# Patient Record
Sex: FEMALE | Race: WHITE | NOT HISPANIC OR LATINO | Employment: OTHER | URBAN - METROPOLITAN AREA
[De-identification: names, ages, dates, MRNs, and addresses within clinical notes are randomized per-mention and may not be internally consistent; named-entity substitution may affect disease eponyms.]

---

## 2020-05-22 ENCOUNTER — HOSPITAL ENCOUNTER (INPATIENT)
Facility: HOSPITAL | Age: 65
LOS: 2 days | Discharge: HOME | End: 2020-05-24
Attending: HOSPITALIST | Admitting: INTERNAL MEDICINE

## 2020-05-22 ENCOUNTER — APPOINTMENT (OUTPATIENT)
Dept: MRI IMAGING | Facility: HOSPITAL | Age: 65
End: 2020-05-22

## 2020-05-22 PROBLEM — M19.90 ARTHRITIS: Status: ACTIVE | Noted: 2020-05-22

## 2020-05-22 PROBLEM — R25.2 MUSCLE CRAMPS: Status: ACTIVE | Noted: 2020-05-22

## 2020-05-22 PROBLEM — E87.1 HYPONATREMIA: Status: ACTIVE | Noted: 2020-05-22

## 2020-05-22 PROBLEM — I10 ESSENTIAL HYPERTENSION: Status: ACTIVE | Noted: 2020-05-22

## 2020-05-22 PROBLEM — R52 GENERALIZED BODY ACHES: Status: ACTIVE | Noted: 2020-05-22

## 2020-05-22 LAB
ALBUMIN SERPL-MCNC: 4.2 G/DL (ref 3.5–5.2)
ALBUMIN/GLOB SERPL: 1.5 G/DL
ALP SERPL-CCNC: 165 U/L (ref 39–117)
ALT SERPL W P-5'-P-CCNC: 19 U/L (ref 1–33)
ANION GAP SERPL CALCULATED.3IONS-SCNC: 9.9 MMOL/L (ref 5–15)
AST SERPL-CCNC: 29 U/L (ref 1–32)
BASOPHILS # BLD AUTO: 0.04 10*3/MM3 (ref 0–0.2)
BASOPHILS NFR BLD AUTO: 0.6 % (ref 0–1.5)
BILIRUB SERPL-MCNC: 0.5 MG/DL (ref 0.2–1.2)
BUN BLD-MCNC: 11 MG/DL (ref 8–23)
BUN/CREAT SERPL: 13.4 (ref 7–25)
CALCIUM SPEC-SCNC: 9.5 MG/DL (ref 8.6–10.5)
CHLORIDE SERPL-SCNC: 88 MMOL/L (ref 98–107)
CO2 SERPL-SCNC: 24.1 MMOL/L (ref 22–29)
CORTIS SERPL-MCNC: 8.19 MCG/DL
CREAT BLD-MCNC: 0.82 MG/DL (ref 0.57–1)
DEPRECATED RDW RBC AUTO: 45.1 FL (ref 37–54)
EOSINOPHIL # BLD AUTO: 0.05 10*3/MM3 (ref 0–0.4)
EOSINOPHIL NFR BLD AUTO: 0.8 % (ref 0.3–6.2)
ERYTHROCYTE [DISTWIDTH] IN BLOOD BY AUTOMATED COUNT: 14 % (ref 12.3–15.4)
GFR SERPL CREATININE-BSD FRML MDRD: 70 ML/MIN/1.73
GLOBULIN UR ELPH-MCNC: 2.8 GM/DL
GLUCOSE BLD-MCNC: 93 MG/DL (ref 65–99)
HCT VFR BLD AUTO: 35.3 % (ref 34–46.6)
HGB BLD-MCNC: 12.4 G/DL (ref 12–15.9)
IMM GRANULOCYTES # BLD AUTO: 0.01 10*3/MM3 (ref 0–0.05)
IMM GRANULOCYTES NFR BLD AUTO: 0.2 % (ref 0–0.5)
LYMPHOCYTES # BLD AUTO: 1.86 10*3/MM3 (ref 0.7–3.1)
LYMPHOCYTES NFR BLD AUTO: 28.3 % (ref 19.6–45.3)
MCH RBC QN AUTO: 31.2 PG (ref 26.6–33)
MCHC RBC AUTO-ENTMCNC: 35.1 G/DL (ref 31.5–35.7)
MCV RBC AUTO: 88.7 FL (ref 79–97)
MONOCYTES # BLD AUTO: 0.69 10*3/MM3 (ref 0.1–0.9)
MONOCYTES NFR BLD AUTO: 10.5 % (ref 5–12)
NEUTROPHILS # BLD AUTO: 3.92 10*3/MM3 (ref 1.7–7)
NEUTROPHILS NFR BLD AUTO: 59.6 % (ref 42.7–76)
NRBC BLD AUTO-RTO: 0 /100 WBC (ref 0–0.2)
OSMOLALITY UR: 166 MOSM/KG
PLATELET # BLD AUTO: 340 10*3/MM3 (ref 140–450)
PMV BLD AUTO: 10 FL (ref 6–12)
POTASSIUM BLD-SCNC: 3.8 MMOL/L (ref 3.5–5.2)
PROT SERPL-MCNC: 7 G/DL (ref 6–8.5)
RBC # BLD AUTO: 3.98 10*6/MM3 (ref 3.77–5.28)
SODIUM BLD-SCNC: 122 MMOL/L (ref 136–145)
WBC NRBC COR # BLD: 6.57 10*3/MM3 (ref 3.4–10.8)

## 2020-05-22 PROCEDURE — 80053 COMPREHEN METABOLIC PANEL: CPT | Performed by: NURSE PRACTITIONER

## 2020-05-22 PROCEDURE — 0 GADOBENATE DIMEGLUMINE 529 MG/ML SOLUTION: Performed by: INTERNAL MEDICINE

## 2020-05-22 PROCEDURE — 25010000002 LORAZEPAM PER 2 MG: Performed by: INTERNAL MEDICINE

## 2020-05-22 PROCEDURE — 85025 COMPLETE CBC W/AUTO DIFF WBC: CPT | Performed by: NURSE PRACTITIONER

## 2020-05-22 PROCEDURE — A9577 INJ MULTIHANCE: HCPCS | Performed by: INTERNAL MEDICINE

## 2020-05-22 PROCEDURE — 82533 TOTAL CORTISOL: CPT | Performed by: NURSE PRACTITIONER

## 2020-05-22 PROCEDURE — 83935 ASSAY OF URINE OSMOLALITY: CPT | Performed by: NURSE PRACTITIONER

## 2020-05-22 PROCEDURE — 70551 MRI BRAIN STEM W/O DYE: CPT

## 2020-05-22 RX ORDER — SODIUM CHLORIDE 0.9 % (FLUSH) 0.9 %
10 SYRINGE (ML) INJECTION AS NEEDED
Status: DISCONTINUED | OUTPATIENT
Start: 2020-05-22 | End: 2020-05-24 | Stop reason: HOSPADM

## 2020-05-22 RX ORDER — NITROGLYCERIN 0.4 MG/1
0.4 TABLET SUBLINGUAL
Status: DISCONTINUED | OUTPATIENT
Start: 2020-05-22 | End: 2020-05-24 | Stop reason: HOSPADM

## 2020-05-22 RX ORDER — PROMETHAZINE HYDROCHLORIDE 25 MG/1
25 TABLET ORAL EVERY 6 HOURS PRN
COMMUNITY

## 2020-05-22 RX ORDER — LISINOPRIL 10 MG/1
TABLET ORAL
COMMUNITY
Start: 2020-02-14

## 2020-05-22 RX ORDER — LORAZEPAM 2 MG/ML
1 INJECTION INTRAMUSCULAR ONCE AS NEEDED
Status: COMPLETED | OUTPATIENT
Start: 2020-05-22 | End: 2020-05-22

## 2020-05-22 RX ORDER — LISINOPRIL 10 MG/1
10 TABLET ORAL DAILY
Status: DISCONTINUED | OUTPATIENT
Start: 2020-05-22 | End: 2020-05-22

## 2020-05-22 RX ORDER — SODIUM CHLORIDE 0.9 % (FLUSH) 0.9 %
10 SYRINGE (ML) INJECTION EVERY 12 HOURS SCHEDULED
Status: DISCONTINUED | OUTPATIENT
Start: 2020-05-22 | End: 2020-05-24 | Stop reason: HOSPADM

## 2020-05-22 RX ORDER — DICLOFENAC SODIUM 75 MG/1
75 TABLET, DELAYED RELEASE ORAL
COMMUNITY
Start: 2019-11-19 | End: 2021-01-06

## 2020-05-22 RX ORDER — ACETAMINOPHEN 650 MG/1
650 SUPPOSITORY RECTAL EVERY 4 HOURS PRN
Status: DISCONTINUED | OUTPATIENT
Start: 2020-05-22 | End: 2020-05-24 | Stop reason: HOSPADM

## 2020-05-22 RX ORDER — ACETAMINOPHEN 160 MG/5ML
650 SOLUTION ORAL EVERY 4 HOURS PRN
Status: DISCONTINUED | OUTPATIENT
Start: 2020-05-22 | End: 2020-05-24 | Stop reason: HOSPADM

## 2020-05-22 RX ORDER — ACETAMINOPHEN 325 MG/1
650 TABLET ORAL EVERY 4 HOURS PRN
Status: DISCONTINUED | OUTPATIENT
Start: 2020-05-22 | End: 2020-05-24 | Stop reason: HOSPADM

## 2020-05-22 RX ORDER — ONDANSETRON 2 MG/ML
4 INJECTION INTRAMUSCULAR; INTRAVENOUS EVERY 6 HOURS PRN
Status: DISCONTINUED | OUTPATIENT
Start: 2020-05-22 | End: 2020-05-24 | Stop reason: HOSPADM

## 2020-05-22 RX ORDER — LISINOPRIL 10 MG/1
10 TABLET ORAL NIGHTLY
Status: DISCONTINUED | OUTPATIENT
Start: 2020-05-22 | End: 2020-05-24 | Stop reason: HOSPADM

## 2020-05-22 RX ADMIN — LISINOPRIL 10 MG: 10 TABLET ORAL at 22:09

## 2020-05-22 RX ADMIN — SODIUM CHLORIDE, PRESERVATIVE FREE 10 ML: 5 INJECTION INTRAVENOUS at 02:10

## 2020-05-22 RX ADMIN — SODIUM CHLORIDE, PRESERVATIVE FREE 10 ML: 5 INJECTION INTRAVENOUS at 08:09

## 2020-05-22 RX ADMIN — LORAZEPAM 1 MG: 2 INJECTION INTRAMUSCULAR; INTRAVENOUS at 12:19

## 2020-05-22 RX ADMIN — SODIUM CHLORIDE, PRESERVATIVE FREE 10 ML: 5 INJECTION INTRAVENOUS at 20:41

## 2020-05-22 NOTE — CONSULTS
Referring Provider: Dr. Abiel May  Reason for Consultation: hyponatremia    Subjective     Chief complaint No chief complaint on file.      History of present illness:  64 yo WF with normal renal fxn who denies prior problems with hypoNa, admitted earlier today for further evaluation of weakness, paresthesias, and muscle cramping.  Found to have a serum sodium level of 122.  Last serum sodium level for comparison from December 2018: serum sodium then was 139.  Full PMH outlined below; pertinent is hypertension on lisinopril and no diuretic use; chronic NSAID use; no prior urinary tract infections; no prior stones.  Drinks easily 64 ounces of water every day in addition to other beverages; denies any alcohol use.  Blood pressure stable while here; cortisol level normal.  Recent visit to ER at [facility] within the last few days; TSH normal when checked there.  Serum sodium level was 118  · States that her fatigue and paresthesias have been present for several weeks; she has had longstanding problems with muscle cramps that seem to worsen over the last few days  · Loss of appetite for the past few months with 20-pound weight loss over the past 3 months; no nausea or vomiting  · No urinary complaints other than polyuria and nocturia  · Bowel movements fine  · No leg swelling  · No shortness of breath or orthopnea; no fever, chills, or cough    Past Medical History:   Diagnosis Date   • Bleeding ulcer     2014   • Hx of staphylococcal infection     LEFT KNEE   • Knee pain, right    • Shoulder pain, right      Past Surgical History:   Procedure Laterality Date   • KNEE ARTHROSCOPY Bilateral    • AK TOTAL KNEE ARTHROPLASTY Right 4/20/2016    Procedure: RT TOTAL KNEE ARTHROPLASTY, STEROID INJECTION RIGHT SHOULDER;  Surgeon: Conner Story MD;  Location: [facility];  Service: Orthopedics   • SINUS SURGERY      X2   • TOTAL KNEE ARTHROPLASTY Left      No family history on file.  Social History  "    Tobacco Use   • Smoking status: Never Smoker   • Smokeless tobacco: Never Used   Substance Use Topics   • Alcohol use: No   • Drug use: No     Medications Prior to Admission   Medication Sig Dispense Refill Last Dose   • b complex-C-folic acid 1 MG capsule Take 1 capsule by mouth Daily.   Past Week at Unknown time   • diclofenac (VOLTAREN) 75 MG EC tablet Take 75 mg by mouth.   Past Week at Unknown time   • IBUPROFEN PO Take  by mouth.   Past Week at Unknown time   • lisinopril (PRINIVIL,ZESTRIL) 10 MG tablet TAKE 1 TABLET BY MOUTH DAILY   Past Week at Unknown time   • promethazine (PHENERGAN) 25 MG tablet Take 25 mg by mouth Every 6 (Six) Hours As Needed for Nausea or Vomiting.   5/21/2020 at Unknown time   • Turmeric 1053 MG tablet Take 1,053 mg by mouth Daily.   Past Week at Unknown time     Allergies:  Vancomycin    Review of Systems  14-point ROS performed and all negative except for pertinent +/-'s detailed in HPI.     Objective     Vital Signs  Temp:  [97.8 °F (36.6 °C)-98.3 °F (36.8 °C)] 97.8 °F (36.6 °C)  Heart Rate:  [76-96] 77  Resp:  [16-18] 18  BP: (111-164)/(58-76) 127/68    Flowsheet Rows      First Filed Value   Admission Height  162.6 cm (64\") Documented at 05/22/2020 0137   Admission Weight  75.3 kg (166 lb) Documented at 05/22/2020 0137           No intake/output data recorded.  I/O last 3 completed shifts:  In: -   Out: 1700 [Urine:1700]    Intake/Output Summary (Last 24 hours) at 5/22/2020 1955  Last data filed at 5/22/2020 1739  Gross per 24 hour   Intake --   Output 1700 ml   Net -1700 ml       Physical Exam:  NAD; pleasant; oriented; looks stated age  Odd affect; normal mood  MMM; AT/NC   No eye discharge; no scleral icterus  No JVD; no carotid bruits  CTA bilat; not labored  RRR, no rub  Soft, NT, ND, BS+  No edema  No clubbing  No asterixis  Moves all extremities   Speech is fluent    Results Review:  Results from last 7 days   Lab Units 05/22/20  0206   SODIUM mmol/L 122*   POTASSIUM " mmol/L 3.8   CHLORIDE mmol/L 88*   CO2 mmol/L 24.1   BUN mg/dL 11   CREATININE mg/dL 0.82   CALCIUM mg/dL 9.5   BILIRUBIN mg/dL 0.5   ALK PHOS U/L 165*   ALT (SGPT) U/L 19   AST (SGOT) U/L 29   GLUCOSE mg/dL 93       Estimated Creatinine Clearance: 67 mL/min (by C-G formula based on SCr of 0.82 mg/dL).          Results from last 7 days   Lab Units 05/22/20  0206   WBC 10*3/mm3 6.57   HEMOGLOBIN g/dL 12.4   PLATELETS 10*3/mm3 340             Active Medications    gadobenate dimeglumine 15 mL Intravenous Once in imaging   lisinopril 10 mg Oral Daily   sodium chloride 10 mL Intravenous Q12H          Assessment/Plan   Assessment  1.  Hyponatremia with euvolemia: Probably due to polydipsia; drinks at least 64 ounces of water daily in addition to other beverages.  Regularly takes NSAIDs (diclofenac), which impairs renal water excretion, thereby frequently exacerbating hyponatremia.  Low urine osmolality consistent with polydipsia.  Normal TSH and cortisol; normal serum creatinine.  Already has good urine output, suggesting water diuresis  2.  Hypertension, controlled  3.  Fatigue, paresthesias, and muscle cramping: possibly all due to her hyponatremia      Hyponatremia    Generalized body aches    Essential hypertension    Arthritis    Muscle cramps      Plan  1.  Modest fluid restriction, 1500 mL/day  2.  Surveillance labs    I discussed the patient's findings and my recommendations with the patient    Feliz Bojorquez MD  05/22/20  19:55

## 2020-05-22 NOTE — PROGRESS NOTES
Discharge Planning Assessment  [facility]     Patient Name: Luann Rodríguez  MRN: [id number]  Today's Date: 5/22/2020    Admit Date: 5/22/2020    Discharge Needs Assessment     Row Name 05/22/20 1601       Living Environment    Lives With  spouse    Name(s) of Who Lives With Patient  Igor Rodríguez, spouse, [phone]    Current Living Arrangements  home/apartment/condo    Primary Care Provided by  self    Provides Primary Care For  no one    Family Caregiver if Needed  spouse    Family Caregiver Names  Igor Rodríguez, spouse, [phone]    Quality of Family Relationships  unable to assess    Able to Return to Prior Arrangements  yes       Resource/Environmental Concerns    Resource/Environmental Concerns  home accessibility    Home Accessibility Concerns  stairs to enter home       Transition Planning    Patient/Family Anticipates Transition to  home with family    Patient/Family Anticipated Services at Transition  none    Transportation Anticipated  family or friend will provide       Discharge Needs Assessment    Readmission Within the Last 30 Days  no previous admission in last 30 days    Concerns to be Addressed  denies needs/concerns at this time;care coordination/care conferences;discharge planning    Equipment Currently Used at Home  walker, rolling    Anticipated Changes Related to Illness  none    Equipment Needed After Discharge  none    Provided Post Acute Provider List?  Refused        Discharge Plan     Row Name 05/22/20 8251       Plan    Plan  Home with spouse. Denies any D/C needs. Family to transport at D/C    Patient/Family in Agreement with Plan  yes    Plan Comments  Met with pt. at bedside. Explained roll of . Face sheet and pharmacy verified. Pt lives with Igor Rodríguez, spouse, [phone]. There are 3 steps to enter home.  DME equipment includes a walker that pt. states she no longer uses.  Pt is independent with ADLs. Pt has never been to Rehab but has used [facility]  in  the past. Pt's PCP is Dr ADA Elliott. Uses [facility] on [address] [facility] [facility] in [place]. Pt drives herself to appointments. At discharge, family will transport. Pt denies any discharge needs. Explained that CCP would follow to assess for discharge needs.  Meet Gomez RN-BC               Demographic Summary     Row Name 05/22/20 5875       General Information    Admission Type  inpatient    Arrived From  other (see comments) emergency room at [facility]    Required Notices Provided  Important Message from Medicare    Reason for Consult  care coordination/care conference;discharge planning;decision making    Preferred Language  English     Used During This Interaction  no        Functional Status     Row Name 05/22/20 1600       Functional Status    Usual Activity Tolerance  good    Current Activity Tolerance  moderate       Functional Status, IADL    Medications  independent    Meal Preparation  independent    Housekeeping  independent    Laundry  independent    Shopping  independent       Mental Status    General Appearance WDL  WDL       Mental Status Summary    Recent Changes in Mental Status/Cognitive Functioning  mental status                Meet Gomez, RN

## 2020-05-22 NOTE — PLAN OF CARE
Problem: Patient Care Overview  Goal: Plan of Care Review  Outcome: Ongoing (interventions implemented as appropriate)  Flowsheets  Taken 5/22/2020 0573  Progress: no change  Outcome Summary: Pt direct admit from [facility] ER for heart attack rule out, NA was 118, now 122. No c/o pain or soa. Pt put on strict I&Os and to have MRI of brain. Pt states that she fell and broke her pelvis in 3 places, and presents with multiple bruises over her body, however, states that she feels safe at home and is not threatened. CMP consult placed to look into that further. WCM  Taken 5/22/2020 0211  Plan of Care Reviewed With: patient

## 2020-05-22 NOTE — H&P
"    Patient Name:  Luann Rodríguez  YOB: 1955  MRN:  [id number]  Admit Date:  5/22/2020  Patient Care Team:  Jayro Elliott MD as PCP - General (Family Medicine)      Subjective   History Present Illness     Chief complaint: Generalized muscle cramping and weakness, fatigue, anxiety    History of Present Illness   Mrs. Rodríguez is a 65-year-old female with history of hypertension and arthritis who presented to the emergency room at [facility] for some generalized muscle aches, weakness, anxiety.  Patient told emergency room physician there that she wanted to \"rule out a heart attack\".  Patient states for the last few days she does feel an fatigued, having all over muscle spasms that come and go in different areas.  She states her right hip and right foot are the worse muscle spasms, but she does have them on her left side as well.  She denies any focal weakness, although she does complain of some generalized weakness and fatigue over the last few days.  She denies any change in fluid intake.  She does states she has not been as hungry and not been eating as much as she normally does.  She denies any fever, cough, chills, chest pain, shortness of breath, or being around anyone who is been ill recently.  She does not complain of any acute chest pain, she does felt very anxious and felt like something was not right so she went to the emergency room.  [facility], creatinine was 0.8, BUN 13, GFR 72 alkaline phosphatase 171, white blood cell 8.1, hemoglobin 12.6, TSH 2.23.  Her urinalysis was negative.  EKG showed normal sinus rhythm.  Chest x-ray shows no evidence of acute cardiopulmonary abnormality.  Suspected left posterior ninth rib fracture, but patient not complaining of any rib pain or any recent falls or injuries.  The head showed no intracranial hemorrhage or large vessel territory ischemic acute ischemia.  There was mild chronic small vessel " ischemic changes and small infarct in the right corona radiata region favored to be subacute or chronic.  Patient denies ever being told she had a stroke before, she is not on any antiplatelet medication.  When she arrived here to [facility] repeat CMP showed a sodium of 122, which was increased by 4, potassium 3.8, creatinine 0.82, alkaline phosphatase 165, white blood cell count 6.5, hemoglobin 12.4, cortisol level 8.19.  Patient is in no acute distress, no focal neuro deficits on exam.    Review of Systems   Constitutional: Positive for activity change, appetite change (poor appetite) and fatigue. Negative for fever.   HENT: Negative.  Negative for nosebleeds and trouble swallowing.    Eyes: Negative for photophobia, redness and visual disturbance.   Respiratory: Negative for cough, chest tightness, shortness of breath and wheezing.    Cardiovascular: Negative for chest pain, palpitations and leg swelling.   Gastrointestinal: Negative for abdominal distention, abdominal pain, nausea and vomiting.   Endocrine: Negative.    Genitourinary: Negative.  Negative for difficulty urinating, dysuria, frequency and urgency.   Musculoskeletal: Positive for myalgias. Negative for gait problem and joint swelling.   Skin: Negative.    Neurological: Positive for weakness (generalized). Negative for dizziness, seizures, speech difficulty, light-headedness and headaches.   Hematological: Negative.    Psychiatric/Behavioral: Negative for behavioral problems and confusion.        Personal History     Past Medical History:   Diagnosis Date   • Bleeding ulcer     2014   • Hx of staphylococcal infection     LEFT KNEE   • Knee pain, right    • Shoulder pain, right      Past Surgical History:   Procedure Laterality Date   • KNEE ARTHROSCOPY Bilateral    • [place] TOTAL KNEE ARTHROPLASTY Right 4/20/2016    Procedure: RT TOTAL KNEE ARTHROPLASTY, STEROID INJECTION RIGHT SHOULDER;  Surgeon: Conner Story MD;  Location: [facility]  OR;  Service: Orthopedics   • SINUS SURGERY      X2   • TOTAL KNEE ARTHROPLASTY Left      No family history on file.  Social History     Tobacco Use   • Smoking status: Never Smoker   • Smokeless tobacco: Never Used   Substance Use Topics   • Alcohol use: No   • Drug use: No     No current facility-administered medications on file prior to encounter.      Current Outpatient Medications on File Prior to Encounter   Medication Sig Dispense Refill   • b complex-C-folic acid 1 MG capsule Take 1 capsule by mouth Daily.     • diclofenac (VOLTAREN) 75 MG EC tablet Take 75 mg by mouth.     • IBUPROFEN PO Take  by mouth.     • lisinopril (PRINIVIL,ZESTRIL) 10 MG tablet TAKE 1 TABLET BY MOUTH DAILY     • promethazine (PHENERGAN) 25 MG tablet Take 25 mg by mouth Every 6 (Six) Hours As Needed for Nausea or Vomiting.     • Turmeric 1053 MG tablet Take 1,053 mg by mouth Daily.       Allergies   Allergen Reactions   • Vancomycin Rash       Objective    Objective     Vital Signs  Temp:  [97.8 °F (36.6 °C)] 97.8 °F (36.6 °C)  Heart Rate:  [78] 78  Resp:  [16] 16  BP: (158)/(76) 158/76  SpO2:  [100 %] 100 %  on   ;   Device (Oxygen Therapy): room air  Body mass index is 28.49 kg/m².    Physical Exam   Constitutional: She is oriented to person, place, and time. Vital signs are normal. She appears well-developed and well-nourished. No distress.   HENT:   Head: Normocephalic.   Eyes: EOM are normal.   Neck: Normal range of motion. No JVD present.   Cardiovascular: Normal rate, regular rhythm and normal heart sounds.   Personally normal sinus rhythm on the monitor with heart rate 82 during my exam.  No complaint of chest pain or palpitations.   Pulmonary/Chest: Effort normal and breath sounds normal.   Lung sounds clear, sats 98% on room air during my exam.   Abdominal: Soft. Bowel sounds are normal. She exhibits no distension. There is no tenderness.   Musculoskeletal: Normal range of motion.   Patient complaining of muscle spasms that  "come and go in different areas, mostly in her right hip and right foot, but sometimes in her left foot.  She states her \"my whole body feels numb and tingly at times\"   Neurological: She is alert and oriented to person, place, and time. She has normal strength. No sensory deficit.   No focal neuro deficits, will patient complains of generalized weakness and decreased sensation over her whole body, she could tell the difference between sharp and dull in all extremities without difficulty.  She has normal strength in all extremities.  She has no facial asymmetry, no visual difficulties.  She does states she has some numbness and tingling all over at times, but right now she did not have any tingling.   Skin: Skin is warm and dry. Capillary refill takes less than 2 seconds.   Psychiatric: Her speech is normal and behavior is normal. Judgment and thought content normal. Cognition and memory are normal.   Nursing note and vitals reviewed.      Results Review:  I reviewed the patient's new clinical results.  I reviewed the patient's new imaging results and agree with the interpretation.  I reviewed the patient's other test results and agree with the interpretation  I personally viewed and interpreted the patient's EKG/Telemetry data  Discussed with ED provider.    Lab Results (last 24 hours)     Procedure Component Value Units Date/Time    Comprehensive Metabolic Panel [[id number]]  (Abnormal) Collected:  05/22/20 0206    Specimen:  Blood Updated:  05/22/20 0250     Glucose 93 mg/dL      BUN 11 mg/dL      Creatinine 0.82 mg/dL      Sodium 122 mmol/L      Potassium 3.8 mmol/L      Chloride 88 mmol/L      CO2 24.1 mmol/L      Calcium 9.5 mg/dL      Total Protein 7.0 g/dL      Albumin 4.20 g/dL      ALT (SGPT) 19 U/L      AST (SGOT) 29 U/L      Alkaline Phosphatase 165 U/L      Total Bilirubin 0.5 mg/dL      eGFR Non African Amer 70 mL/min/1.73      Globulin 2.8 gm/dL      A/G Ratio 1.5 g/dL      BUN/Creatinine Ratio 13.4     " Anion Gap 9.9 mmol/L     Narrative:       GFR Normal >60  Chronic Kidney Disease <60  Kidney Failure <15      CBC Auto Differential [[id number]]  (Normal) Collected:  05/22/20 0206    Specimen:  Blood Updated:  05/22/20 0249     WBC 6.57 10*3/mm3      RBC 3.98 10*6/mm3      Hemoglobin 12.4 g/dL      Hematocrit 35.3 %      MCV 88.7 fL      MCH 31.2 pg      MCHC 35.1 g/dL      RDW 14.0 %      RDW-SD 45.1 fl      MPV 10.0 fL      Platelets 340 10*3/mm3      Neutrophil % 59.6 %      Lymphocyte % 28.3 %      Monocyte % 10.5 %      Eosinophil % 0.8 %      Basophil % 0.6 %      Immature Grans % 0.2 %      Neutrophils, Absolute 3.92 10*3/mm3      Lymphocytes, Absolute 1.86 10*3/mm3      Monocytes, Absolute 0.69 10*3/mm3      Eosinophils, Absolute 0.05 10*3/mm3      Basophils, Absolute 0.04 10*3/mm3      Immature Grans, Absolute 0.01 10*3/mm3      nRBC 0.0 /100 WBC     Cortisol [[id number]] Collected:  05/22/20 0206    Specimen:  Blood Updated:  05/22/20 0259     Cortisol 8.19 mcg/dL     Narrative:       Cortisol Reference Ranges:    Cortisol 6AM - 10AM Range: 6.02-18.40 mcg/dl  Cortisol 4PM - 8PM Range: 2.68-10.50 mcg/dl      Results may be falsely increased if patient taking Biotin.      Osmolality, Urine - Urine, Clean Catch [[id number]] Collected:  05/22/20 0216    Specimen:  Urine, Clean Catch Updated:  05/22/20 0228          Imaging Results (Last 24 Hours)     ** No results found for the last 24 hours. **               No orders to display        Assessment/Plan     Active Hospital Problems    Diagnosis POA   • **Hyponatremia [E87.1] Unknown   • Generalized body aches [R52] Unknown   • Essential hypertension [I10] Unknown   • Arthritis [M19.90] Unknown   • Muscle cramps [R25.2] Unknown     Mrs. Rodríguez is a 65-year-old female with history of hypertension and arthritis who presented to the emergency room at [facility] for some generalized muscle aches, weakness, anxiety.      Hyponatremia/muscle cramps/body  "aches/fatigue  -Recheck BMP in a.m. upon arrival to [facility] sodium 122, previously was 118 at [facility].  -Check urine Osmo  -Strict intake and output  -We will check MRI, patient describes generalized weakness, although she does states she has more \"trouble\" with her right leg the last few days.  CT of the head showed small infarct in the right corona radiata region favored to be subacute or chronic    Hypertension  -Patient currently on lisinopril, will continue that.  -Monitor blood pressure, telemetry unit.  -Patient given hydralazine at [facility] for BP of 214/92, upon arrival here blood pressure 158/76.  Continue to monitor      · I discussed the patient's findings and my recommendations with patient and ED provider.    VTE Prophylaxis - SCDs.  Code Status - Full code.       OMI Alvarez  [facility]  05/22/20  03:23    "

## 2020-05-22 NOTE — PLAN OF CARE
Problem: Patient Care Overview  Goal: Plan of Care Review  Outcome: Ongoing (interventions implemented as appropriate)  Flowsheets (Taken 5/22/2020 3365)  Progress: improving  Plan of Care Reviewed With: patient  Note:   VSS. MRI done. NA better and nephrology consulted.  No c/o pain. Continue to monitor.

## 2020-05-22 NOTE — PROGRESS NOTES
Continued Stay Note  [facility]     Patient Name: Luann Rodríguez  MRN: [id number]  Today's Date: 5/22/2020    Admit Date: 5/22/2020    Discharge Plan     Row Name 05/22/20 1608       Plan    Plan  Home with spouse. Denies any D/C needs. Family to transport at D/C    Patient/Family in Agreement with Plan  yes    Plan Comments  Met with pt. at bedside. Explained roll of . Face sheet and pharmacy verified. Pt lives with Igor Rodríguez, spouse, [phone]. There are 3 steps to enter home.  DME equipment includes a walker that pt. states she no longer uses.  Pt is independent with ADLs. Pt has never been to Rehab but has used [facility]  in the past. Pt's PCP is Dr ADA Elliott. Uses [facility] on [address] [facility] in [facility] [place] [facility] [place]. Pt drives herself to appointments. At discharge, family will transport. Pt denies any discharge needs. Explained that CCP would follow to assess for discharge needs.  Meet Gomez RN-BC        Discharge Codes    No documentation.             Meet Gomez RN

## 2020-05-23 LAB
ANION GAP SERPL CALCULATED.3IONS-SCNC: 13.4 MMOL/L (ref 5–15)
BUN BLD-MCNC: 11 MG/DL (ref 8–23)
BUN/CREAT SERPL: 12.9 (ref 7–25)
CALCIUM SPEC-SCNC: 9 MG/DL (ref 8.6–10.5)
CHLORIDE SERPL-SCNC: 92 MMOL/L (ref 98–107)
CO2 SERPL-SCNC: 21.6 MMOL/L (ref 22–29)
CREAT BLD-MCNC: 0.85 MG/DL (ref 0.57–1)
GFR SERPL CREATININE-BSD FRML MDRD: 67 ML/MIN/1.73
GLUCOSE BLD-MCNC: 93 MG/DL (ref 65–99)
MAGNESIUM SERPL-MCNC: 2.3 MG/DL (ref 1.6–2.4)
POTASSIUM BLD-SCNC: 4 MMOL/L (ref 3.5–5.2)
POTASSIUM BLD-SCNC: 4.1 MMOL/L (ref 3.5–5.2)
SODIUM BLD-SCNC: 127 MMOL/L (ref 136–145)

## 2020-05-23 PROCEDURE — 84132 ASSAY OF SERUM POTASSIUM: CPT | Performed by: INTERNAL MEDICINE

## 2020-05-23 PROCEDURE — 83735 ASSAY OF MAGNESIUM: CPT | Performed by: INTERNAL MEDICINE

## 2020-05-23 PROCEDURE — 80048 BASIC METABOLIC PNL TOTAL CA: CPT | Performed by: INTERNAL MEDICINE

## 2020-05-23 RX ADMIN — LISINOPRIL 10 MG: 10 TABLET ORAL at 20:03

## 2020-05-23 RX ADMIN — ACETAMINOPHEN 650 MG: 325 TABLET, FILM COATED ORAL at 23:16

## 2020-05-23 RX ADMIN — ACETAMINOPHEN 650 MG: 325 TABLET, FILM COATED ORAL at 08:03

## 2020-05-23 RX ADMIN — SODIUM CHLORIDE, PRESERVATIVE FREE 10 ML: 5 INJECTION INTRAVENOUS at 22:04

## 2020-05-23 RX ADMIN — ACETAMINOPHEN 650 MG: 325 TABLET, FILM COATED ORAL at 00:04

## 2020-05-23 RX ADMIN — SODIUM CHLORIDE, PRESERVATIVE FREE 10 ML: 5 INJECTION INTRAVENOUS at 08:03

## 2020-05-23 NOTE — PLAN OF CARE
Problem: Patient Care Overview  Goal: Plan of Care Review  Outcome: Ongoing (interventions implemented as appropriate)  Flowsheets (Taken 5/23/2020 6972)  Progress: improving  Plan of Care Reviewed With: patient  Note:   VSS. NA now 127.  Continue to educate on reducing water intake.  Possible discharge tomorrow. Continue to monitor.

## 2020-05-23 NOTE — PROGRESS NOTES
Name: Luann Rodríguez ADMIT: 2020   : 1955  PCP: Jayro Elliott MD    MRN: [id number] LOS: 1 days   AGE/SEX: 65 y.o. female  ROOM: [facility]     Subjective   Subjective      The patient is lying in the bed and is in no major distress.  Denies nausea, vomiting abdominal pain, chest pain, palpitations.       Objective   Objective   Vital Signs  Temp:  [97.2 °F (36.2 °C)-97.8 °F (36.6 °C)] 97.6 °F (36.4 °C)  Heart Rate:  [65-78] 65  Resp:  [14-18] 14  BP: (107-153)/(52-74) 107/52  SpO2:  [99 %] 99 %  on   ;   Device (Oxygen Therapy): room air  Body mass index is 28.49 kg/m².  Physical Exam  HEENT:  Atraumatic, normocephalic.  PERRLA.  Extraocular movements intact.  Conjunctivae pink.  Sclerae, no icterus.  Mucous membranes dry.  Oropharynx is  clear.  NECK:  Supple.  No JVD.  HEART:  Regular rate and rhythm.  Normal S1, S2.   LUNGS:  Fairly clear to auscultation anteriorly.  No wheezes.  No crackles.  ABDOMEN:   Soft, nontender.  Bowel sounds present.  No rebound.  No  guarding.  EXTREMITIES:  No cyanosis, clubbing, or edema.  Palpable pedal pulses.  NEURO:  Grossly nonfocal.  No facial asymmetry.  Good strength in all 4  extremities.          Results Review:       I reviewed the patient's new clinical results.  Results from last 7 days   Lab Units 20  0206   WBC 10*3/mm3 6.57   HEMOGLOBIN g/dL 12.4   PLATELETS 10*3/mm3 340     Results from last 7 days   Lab Units 20  0331 20  0206   SODIUM mmol/L 127* 122*   POTASSIUM mmol/L 4.0 3.8   CHLORIDE mmol/L 92* 88*   CO2 mmol/L 21.6* 24.1   BUN mg/dL 11 11   CREATININE mg/dL 0.85 0.82   GLUCOSE mg/dL 93 93   Estimated Creatinine Clearance: 64.6 mL/min (by C-G formula based on SCr of 0.85 mg/dL).  Results from last 7 days   Lab Units 20  0206   ALBUMIN g/dL 4.20   BILIRUBIN mg/dL 0.5   ALK PHOS U/L 165*   AST (SGOT) U/L 29   ALT (SGPT) U/L 19     Results from last 7 days   Lab Units 20  0331 20  0206   CALCIUM mg/dL 9.0  9.5   ALBUMIN g/dL  --  4.20       No results found for: HGBA1C, POCGLU    MRI Brain Without Contrast  Narrative: MRI OF THE BRAIN WITHOUT CONTRAST 05/22/2020     CLINICAL HISTORY: Weakness and numbness in bilateral hands and lower  extremity, possible stroke.     TECHNIQUE: Axial T1, FLAIR, fat-suppressed T2, axial diffusion and  gradient echo T2 and sagittal T1-weighted images were obtained of the  entire head.     There are no prior studies from [facility] for  comparison.           FINDINGS: There is some patchy nodular T2 high signal periventricular  and subcortical white matter in cerebral hemispheres consistent with  mild-to-moderate small vessel disease. There is a 4 mm old lacunar  infarct in the right side of the kip. There is a 6 x 5 mm ovoid area of  encephalomalacia left anterior body of the corpus callosum likely tiny  old white matter infarct. There are tiny 2 to 3 mm old lacunar infarct  in the heads of the caudate nuclei and a 5 mm old lacunar infarct in  posterior right putamen. A 4 mm old lacunar infarct in the anterior left  putamen. There are multiple punctate 1 to 3 mm nodular foci of signal  loss in the gradient echo T2 weighted images compatible with areas of  hemosiderin deposition from old microhemorrhages including 3 in the  superior right parietal lobe, 1 in the posterior superior right temporal  lobe and another in the anterior inferior lateral right temporal lobe, 3  in the lateral left temporal lobe parenchyma. Findings are nonspecific  probably are secondary to underlying amyloid angiopathy. The ventricles  are normal in size. I see no midline shift. No extra-axial fluid  collections are identified and no diffusion-weighted abnormality seen  with no acute infarct identified. There is a 1 cm mucous retention cyst  in the inferior medial left maxillary sinus. A 5 mm mucous retention  cyst inferior medial right maxillary sinus. The remainder of the  paranasal sinuses and  mastoid air cells and middle ear cavities are  clear. Good flow voids are demonstrated within the cerebral vessels and  in the dural venous sinuses. The calvarium and skull base demonstrate  normal marrow signal intensity. The orbits are unremarkable.     Impression: 1. No acute abnormality seen with no acute infarct identified.  2. There is mild-to-moderate small vessel disease in the cerebral white  matter and a 4 mm old lacunar infarct on the right side of the kip and  tiny 2 to 3 mm old bilateral lacunar infarcts in the heads of the  caudate nuclei. A 5 x 6 mm old lacunar infarct in the right putamen and  a 4 x 5 mm old lacunar infarct in anterior left putamen. There is 6 x 5  mm discrete area of encephalomalacia in the left anterior body. There is  corpus callosum likely from a tiny old white matter infarct.  3. There are scattered punctate 1 to 3 mm foci of hemosiderin deposition  from tiny old microhemorrhages in the superior right parietal lobe,  posterior lateral right temporal lobe and lateral left temporal lobe.  Etiology uncertain but statistically most probably secondary to  underlying amyloid angiopathy.     This report was finalized on 5/22/2020 2:35 PM by Dr. Michael Chandler M.D.           gadobenate dimeglumine 15 mL Intravenous Once in imaging   lisinopril 10 mg Oral Nightly   sodium chloride 10 mL Intravenous Q12H      Diet Regular       Assessment/Plan     Active Hospital Problems    Diagnosis  POA   • **Hyponatremia [E87.1]  Unknown   • Generalized body aches [R52]  Unknown   • Essential hypertension [I10]  Unknown   • Arthritis [M19.90]  Unknown   • Muscle cramps [R25.2]  Unknown      Resolved Hospital Problems   No resolved problems to display.       1. Severe hyponatremia, this has been resolving and sodium is 127 today.  If it continues to improve consider discharge home in a.m..  Patient does drink large quantities of water and she is on fluid restriction.  Appreciate nephrology input.  MRI  of the brain revealed old strokes.    2. History of CVA, patient will be placed on aspirin statins upon discharge.    3. Hypertension,  Continue with lisinopril blood pressure is reasonably well controlled.  4. On SCDs for DVT prophylaxis.  5. Code status is full code.      Abiel May MD  [facility]  05/23/20  16:28

## 2020-05-23 NOTE — PLAN OF CARE
Problem: Patient Care Overview  Goal: Plan of Care Review  Outcome: Ongoing (interventions implemented as appropriate)  Flowsheets  Taken 5/22/2020 1743 by Hiral Smith, RN  Progress: improving  Taken 5/22/2020 2015 by Clara Desir, RN  Plan of Care Reviewed With: patient  Taken 5/23/2020 0310 by Clara Desir, RN  Outcome Summary: VSS, A&Ox4, up ad avni, repeat NA this am, no complaints of pain, will continue to monitor.

## 2020-05-24 VITALS
OXYGEN SATURATION: 99 % | WEIGHT: 166 LBS | SYSTOLIC BLOOD PRESSURE: 116 MMHG | DIASTOLIC BLOOD PRESSURE: 59 MMHG | TEMPERATURE: 98.1 F | RESPIRATION RATE: 16 BRPM | HEIGHT: 64 IN | HEART RATE: 90 BPM | BODY MASS INDEX: 28.34 KG/M2

## 2020-05-24 LAB
ALBUMIN SERPL-MCNC: 4.1 G/DL (ref 3.5–5.2)
ANION GAP SERPL CALCULATED.3IONS-SCNC: 10 MMOL/L (ref 5–15)
BUN BLD-MCNC: 11 MG/DL (ref 8–23)
BUN/CREAT SERPL: 13.1 (ref 7–25)
CALCIUM SPEC-SCNC: 8.9 MG/DL (ref 8.6–10.5)
CHLORIDE SERPL-SCNC: 93 MMOL/L (ref 98–107)
CO2 SERPL-SCNC: 25 MMOL/L (ref 22–29)
CREAT BLD-MCNC: 0.84 MG/DL (ref 0.57–1)
GFR SERPL CREATININE-BSD FRML MDRD: 68 ML/MIN/1.73
GLUCOSE BLD-MCNC: 97 MG/DL (ref 65–99)
MAGNESIUM SERPL-MCNC: 2.4 MG/DL (ref 1.6–2.4)
PHOSPHATE SERPL-MCNC: 3.1 MG/DL (ref 2.5–4.5)
POTASSIUM BLD-SCNC: 4.2 MMOL/L (ref 3.5–5.2)
SODIUM BLD-SCNC: 128 MMOL/L (ref 136–145)

## 2020-05-24 PROCEDURE — 80069 RENAL FUNCTION PANEL: CPT | Performed by: INTERNAL MEDICINE

## 2020-05-24 PROCEDURE — 83735 ASSAY OF MAGNESIUM: CPT | Performed by: INTERNAL MEDICINE

## 2020-05-24 RX ORDER — SIMVASTATIN 5 MG
5 TABLET ORAL NIGHTLY
Qty: 30 TABLET | Refills: 0 | Status: SHIPPED | OUTPATIENT
Start: 2020-05-24 | End: 2020-06-23

## 2020-05-24 RX ORDER — ASPIRIN 81 MG/1
81 TABLET ORAL DAILY
Qty: 30 TABLET | Refills: 0 | Status: SHIPPED | OUTPATIENT
Start: 2020-05-24 | End: 2020-06-23

## 2020-05-24 RX ADMIN — SODIUM CHLORIDE, PRESERVATIVE FREE 10 ML: 5 INJECTION INTRAVENOUS at 08:20

## 2020-05-24 NOTE — DISCHARGE SUMMARY
"       NAME: Luann Rodríguez ADMIT: 2020   : 1955  PCP: Jayro Elliott MD    MRN: [id number] LOS: 2 days   AGE/SEX: 65 y.o. female  ROOM: E461/1     Date of Admission:  2020  Date of Discharge:  2020    PCP: Jayro Elliott MD    CHIEF COMPLAINT  No chief complaint on file.      DISCHARGE DIAGNOSIS  Active Hospital Problems    Diagnosis  POA   • **Hyponatremia [E87.1]  Unknown   • Generalized body aches [R52]  Unknown   • Essential hypertension [I10]  Unknown   • Arthritis [M19.90]  Unknown   • Muscle cramps [R25.2]  Unknown      Resolved Hospital Problems   No resolved problems to display.       SECONDARY DIAGNOSES  Past Medical History:   Diagnosis Date   • Bleeding ulcer        • Hx of staphylococcal infection     LEFT KNEE   • Knee pain, right    • Shoulder pain, right        CONSULTS       HOSPITAL COURSE  Mrs. Rodríguez is a 65-year-old female with history of hypertension and arthritis who presented to the emergency room at [facility] for some generalized muscle aches, weakness, anxiety.  Patient told emergency room physician there that she wanted to \"rule out a heart attack\".  Patient states for the last few days she does feel an fatigued, having all over muscle spasms that come and go in different areas.  She states her right hip and right foot are the worse muscle spasms, but she does have them on her left side as well.  She denies any focal weakness, although she does complain of some generalized weakness and fatigue over the last few days.  She denies any change in fluid intake.  She does states she has not been as hungry and not been eating as much as she normally does.  She denies any fever, cough, chills, chest pain, shortness of breath, or being around anyone who is been ill recently.  She does not complain of any acute chest pain, she does felt very anxious and felt like something was not right so she went to the emergency room.  The emergency room at [facility]" room level of 118, creatinine was 0.8, BUN 13, GFR 72 alkaline phosphatase 171, white blood cell 8.1, hemoglobin 12.6, TSH 2.23.  Her urinalysis was negative.  EKG showed normal sinus rhythm.  Chest x-ray shows no evidence of acute cardiopulmonary abnormality.  Suspected left posterior ninth rib fracture, but patient not complaining of any rib pain or any recent falls or injuries.  The head showed no intracranial hemorrhage or large vessel territory ischemic acute ischemia.  There was mild chronic small vessel ischemic changes and small infarct in the right corona radiata region favored to be subacute or chronic.  Patient denies ever being told she had a stroke before, she is not on any antiplatelet medication.  When she arrived here to [facility] repeat CMP showed a sodium of 122, which was increased by 4, potassium 3.8, creatinine 0.82, alkaline phosphatase 165, white blood cell count 6.5, hemoglobin 12.4, cortisol level 8.19.  Patient is in no acute distress, no focal neuro deficits on exam.           1. Severe hyponatremia, this has been resolving and sodium is 128 today.  On further questioning patient states she drinks large quantities of water at least a gallon a day.  Patient was volume restricted with which she started improving and it is 128 today.  She is currently asymptomatic and was seen by nephrology also and was cleared to be discharged home.  MRI of the brain was done which did not reveal any evidence of mass.  Again have advised her to follow-up with primary care provider in 1 week for a repeat BMP.  If sodium does not improve despite volume restriction and if it drops again then she needs a CT of the chest and the same is been notified to the patient and she has verbalized understanding.  2. History of CVA, patient will be placed on aspirin statins upon discharge.    3. Hypertension,  Continue with lisinopril blood pressure is reasonably well controlled.       Please note patient  was seen and examined today on day of discharge.  Time taken to discharge 40 minutes.      PHYSICAL EXAM  HEENT: PERRLA, extraocular is intact  Neck: Supple, no JVD  Cardiovascular: Regular rate and rhythm with normal S1 and S2  Respiratory: Fairly clear to auscultation bilaterally  GI: Soft, nontender, bowel sounds are present  Extremities: No cyanosis clubbing or edema, palpable pedal pulses.    CONDITION ON DISCHARGE  Stable.      DISCHARGE DISPOSITION   Home or Self Care      DISCHARGE MEDICATIONS       Your medication list      START taking these medications      Instructions Last Dose Given Next Dose Due   aspirin 81 MG EC tablet      Take 1 tablet by mouth Daily for 30 days.       simvastatin 5 MG tablet  Commonly known as:  Zocor      Take 1 tablet by mouth Every Night for 30 days.          CONTINUE taking these medications      Instructions Last Dose Given Next Dose Due   b complex-C-folic acid 1 MG capsule      Take 1 capsule by mouth Daily.       diclofenac 75 MG EC tablet  Commonly known as:  VOLTAREN      Take 75 mg by mouth.       lisinopril 10 MG tablet  Commonly known as:  PRINIVIL,ZESTRIL      TAKE 1 TABLET BY MOUTH DAILY       promethazine 25 MG tablet  Commonly known as:  PHENERGAN      Take 25 mg by mouth Every 6 (Six) Hours As Needed for Nausea or Vomiting.       Turmeric 1053 MG tablet      Take 1,053 mg by mouth Daily.          STOP taking these medications    IBUPROFEN PO              Where to Get Your Medications      These medications were sent to [facility] - [place], [place] - [address] - [phone] [facility] [phone] FX  [address] [address]    Phone:  [phone]   · aspirin 81 MG EC tablet  · simvastatin 5 MG tablet       Diet Instructions     Diet: Regular      Discharge Diet:  Regular         Activity Instructions     Activity as Tolerated        No future appointments.  Additional Instructions for the Follow-ups that You Need to  Schedule     Discharge Follow-up with PCP   As directed       Currently Documented PCP:    Jayro Elliott MD    PCP Phone Number:    [phone]     Follow Up Details:  1 week           Follow-up Information     Jayro Elliott MD .    Specialty:  Family Medicine  Why:  1 week  Contact information:  [address] [facility] [address]  [phone]                   TEST  RESULTS PENDING AT DISCHARGE         Abiel May MD  [facility]  05/24/20  14:12      l.

## 2020-05-24 NOTE — PROGRESS NOTES
NEPHROLOGY PROGRESS NOTE    PATIENT IDENTIFICATION:   Name:  Luann Rodríguez      MRN:  [id number]     65 y.o.  female             Reason for visit: hyponatremia    SUBJECTIVE:      Feels ok. Ready to go home; understands to reduce fluid intake. Breathing is comfortable. Making good urine.        OBJECTIVE:  Vitals:    05/23/20 1900 05/23/20 2300 05/24/20 0738 05/24/20 1317   BP: 144/63 112/69 124/62 116/59   BP Location: Right arm Right arm Right arm Right arm   Patient Position: Lying Lying Lying Lying   Pulse:  86 69 90   Resp: 18 18 16 16   Temp: 98.1 °F (36.7 °C) 98.4 °F (36.9 °C) 97.8 °F (36.6 °C) 98.1 °F (36.7 °C)   TempSrc: Oral Oral Oral Oral   SpO2:       Weight:       Height:               Body mass index is 28.49 kg/m².    Intake/Output Summary (Last 24 hours) at 5/24/2020 1405  Last data filed at 5/23/2020 1803  Gross per 24 hour   Intake 240 ml   Output --   Net 240 ml     Wt Readings from Last 1 Encounters:   05/22/20 0137 75.3 kg (166 lb)     Wt Readings from Last 3 Encounters:   05/22/20 75.3 kg (166 lb)   05/22/20 75.3 kg (166 lb)   04/20/16 81 kg (178 lb 8 oz)         Physical Exam:  NAD; pleasant; oriented; looks stated age  Normal affect; normal mood  MMM; AT/NC   No eye discharge; no scleral icterus  No JVD; no carotid bruits  CTA bilat; not labored  RRR, no rub  Soft, NT, ND, BS+  Trace edema  No clubbing  No asterixis  Moves all extremities   Speech is fluent      Scheduled meds:      gadobenate dimeglumine 15 mL Intravenous Once in imaging   lisinopril 10 mg Oral Nightly   sodium chloride 10 mL Intravenous Q12H     IV meds:                           Data Review:    Results from last 7 days   Lab Units 05/24/20  0453 05/23/20  1818 05/23/20  0331 05/22/20  0206   SODIUM mmol/L 128*  --  127* 122*   POTASSIUM mmol/L 4.2 4.1 4.0 3.8   CHLORIDE mmol/L 93*  --  92* 88*   CO2 mmol/L 25.0  --  21.6* 24.1   BUN mg/dL 11  --  11 11   CREATININE mg/dL 0.84  --  0.85 0.82   CALCIUM mg/dL 8.9  --   9.0 9.5   BILIRUBIN mg/dL  --   --   --  0.5   ALK PHOS U/L  --   --   --  165*   ALT (SGPT) U/L  --   --   --  19   AST (SGOT) U/L  --   --   --  29   GLUCOSE mg/dL 97  --  93 93     Estimated Creatinine Clearance: 65.4 mL/min (by C-G formula based on SCr of 0.84 mg/dL).  Results from last 7 days   Lab Units 05/22/20  0216   OSMOLALITY UR mOsm/kg 166     Results from last 7 days   Lab Units 05/24/20  0453 05/23/20  1818   MAGNESIUM mg/dL 2.4 2.3   PHOSPHORUS mg/dL 3.1  --        Results from last 7 days   Lab Units 05/22/20  0206   WBC 10*3/mm3 6.57   HEMOGLOBIN g/dL 12.4   PLATELETS 10*3/mm3 340                   ASSESSMENT:     1.  Hyponatremia with euvolemia, improving: Probably due to polydipsia; drinks at least 64 ounces of water daily in addition to other beverages.  Regularly takes NSAID (diclofenac), which impairs renal water excretion, thereby exacerbating hyponatremia.  Low urine osmolality consistent with polydipsia.  Normal TSH and cortisol; normal serum creatinine.  Already has good urine output, suggesting water diuresis  2.  Hypertension, controlled  3.  Fatigue, paresthesias, and muscle cramping: possibly all due to her hyponatremia      Hyponatremia    Generalized body aches    Essential hypertension    Arthritis    Muscle cramps        PLAN:    1.  Continue modest fluid restriction, 1500 mL/day  2.  I recommended to her that she decrease use of NSAIDs  3.  We also talked about importance sufficient protein intake daily as another means to prevent hyponatremia  4.  Home anytime from renal view     I discussed the patient's findings and my recommendations with the patient    Belén Cortez OMI Woodruff  5/24/2020  14:05     I examined this patient, reviewed the data, and personally edited this note.  I agree with the assessment and plan as outlined above.  --Feliz Bojorquez MD  05/24/20  14:05

## 2020-05-24 NOTE — PLAN OF CARE
Problem: Patient Care Overview  Goal: Plan of Care Review  Flowsheets  Taken 5/23/2020 1539 by Hiral Smith, RN  Progress: improving  Taken 5/23/2020 2000 by Clara Desir, RN  Plan of Care Reviewed With: patient  Taken 5/24/2020 0332 by Clara Desir, RN  Outcome Summary: VSS, A&Ox4, up ad avni, recheck NA level this am, tylenol for headache, occasional PVC's sometimes bi or trigemny, potassium and mag within normal range, probable dc home today, will continue to monitor.

## 2020-05-25 NOTE — PROGRESS NOTES
Case Management Discharge Note      Final Note: Home wit spouse.    Provided Post Acute Provider List?: Refused    Destination      No service has been selected for the patient.      Durable Medical Equipment      No service has been selected for the patient.      Dialysis/Infusion      No service has been selected for the patient.      Home Medical Care      No service has been selected for the patient.      Therapy      No service has been selected for the patient.      Community Resources      No service has been selected for the patient.        Transportation Services  Private: Car    Final Discharge Disposition Code: 01 - home or self-care